# Patient Record
Sex: MALE | Employment: FULL TIME | URBAN - NONMETROPOLITAN AREA
[De-identification: names, ages, dates, MRNs, and addresses within clinical notes are randomized per-mention and may not be internally consistent; named-entity substitution may affect disease eponyms.]

---

## 2023-06-13 NOTE — PROGRESS NOTES
"HPI:    Fahad Sandoval is a 37 y.o. male is here today for PE/health maintenance      Patient is a NP to our office-    Pt states that he has a pain- states that he thinks it might be pleurisy, pain is in his right abd- this has been going on for a couple of weeks     Pt had CT abd - last Friday - was having the right sided pain at that time - sharp on and of for several weeks- neg tests/work up   No cough  No fever  No abd pain   No n/v    Had labs in the ED , but no CXR       When was last Tdap?  2015    Hx renal calculi- on and off this year- laser lithotripsy- May- Ed last Friday-sees urologist - Dr Pablo/Dr Mendoza - has appt today     Pt has boys ages 5 and 7 years     Wife is Zara    Immunization History   Administered Date(s) Administered    Influenza, injectable, quadrivalent, preservative free 09/24/2015    Influenza, seasonal, injectable, preservative free 11/27/2012, 11/24/2014    Tdap 09/24/2015         Current Outpatient Medications   Medication Instructions    oxyCODONE-acetaminophen (Percocet) 5-325 mg tablet TAKE 1 TABLET BY MOUTH EVERY 6 HOURS AS NEEDED FOR PAIN FOR UP TO 3 DAYS.    tamsulosin (FLOMAX) 0.4 mg                  Social History     Tobacco Use   Smoking Status Never   Smokeless Tobacco Never           ROS:      PE:    Vitals:    06/14/23 1021   BP: 115/76   BP Location: Right arm   Patient Position: Sitting   BP Cuff Size: Small adult   Pulse: 75   Resp: 14   Temp: 36.3 °C (97.4 °F)   TempSrc: Temporal   SpO2: 98%   Weight: 70.4 kg (155 lb 4.8 oz)   Height: 1.676 m (5' 6\")               Pt is A and O x3, NAD  Head- normocephalic and atraumatic,   EYES- conjunctiva- normal   lids- normal  EARS/NOSE- TM's normal, nasopharynx- normal and atraumatic  OROPHARYNX- normal  NECK- supple, FROM  THYROID- NT, normal size, no nodule noted  LYMPH- no cervical lymph nodes palpated   CV- RRR without murmur  PULM- CTA bilaterally, normal respiratory effort  RESPIRATORY EFFORT- normal , no " retractions or nasal flaring   ABD- normoactive BS's , soft , NT, no hepatosplenomegaly palpated  EXT- no edema,NT  SKIN- no abnormal skin lesions noted  NEURO- no focal deficits  PSYCH- pleasant, normal judgement and insight      The number and complexity of problems addressed is considered moderate.  The amount and/or complexity of data reviewed and analyzed is considered moderate. The risk of complications and/or morbidity/mortality of patient is considered moderate. Overall, this patient encounter is considered a moderate risk visit.        ASSESSMENT/PLAN:    Problem List Items Addressed This Visit    None  Visit Diagnoses       Well adult exam    -  Primary    Screening for diabetes mellitus        Screening for cholesterol level        Encounter for hepatitis C screening test for low risk patient        Pleuritic chest pain                No orders of the defined types were placed in this encounter.        Follow up in 12 months

## 2023-06-14 ENCOUNTER — OFFICE VISIT (OUTPATIENT)
Dept: PRIMARY CARE | Facility: CLINIC | Age: 38
End: 2023-06-14
Payer: COMMERCIAL

## 2023-06-14 ENCOUNTER — LAB (OUTPATIENT)
Dept: LAB | Facility: LAB | Age: 38
End: 2023-06-14
Payer: COMMERCIAL

## 2023-06-14 VITALS
RESPIRATION RATE: 14 BRPM | OXYGEN SATURATION: 98 % | HEART RATE: 75 BPM | DIASTOLIC BLOOD PRESSURE: 76 MMHG | SYSTOLIC BLOOD PRESSURE: 115 MMHG | BODY MASS INDEX: 24.96 KG/M2 | HEIGHT: 66 IN | TEMPERATURE: 97.4 F | WEIGHT: 155.3 LBS

## 2023-06-14 DIAGNOSIS — Z11.59 ENCOUNTER FOR HEPATITIS C SCREENING TEST FOR LOW RISK PATIENT: ICD-10-CM

## 2023-06-14 DIAGNOSIS — Z13.220 SCREENING FOR CHOLESTEROL LEVEL: ICD-10-CM

## 2023-06-14 DIAGNOSIS — Z00.00 WELL ADULT EXAM: Primary | ICD-10-CM

## 2023-06-14 DIAGNOSIS — Z13.1 SCREENING FOR DIABETES MELLITUS: ICD-10-CM

## 2023-06-14 DIAGNOSIS — R07.81 PLEURITIC CHEST PAIN: ICD-10-CM

## 2023-06-14 PROBLEM — N20.0 RENAL CALCULI: Status: ACTIVE | Noted: 2023-06-14

## 2023-06-14 PROCEDURE — 36415 COLL VENOUS BLD VENIPUNCTURE: CPT

## 2023-06-14 PROCEDURE — 99385 PREV VISIT NEW AGE 18-39: CPT | Performed by: FAMILY MEDICINE

## 2023-06-14 PROCEDURE — 80061 LIPID PANEL: CPT

## 2023-06-14 PROCEDURE — 1036F TOBACCO NON-USER: CPT | Performed by: FAMILY MEDICINE

## 2023-06-14 PROCEDURE — 86803 HEPATITIS C AB TEST: CPT

## 2023-06-14 PROCEDURE — 82947 ASSAY GLUCOSE BLOOD QUANT: CPT

## 2023-06-14 RX ORDER — OXYCODONE AND ACETAMINOPHEN 5; 325 MG/1; MG/1
TABLET ORAL
COMMUNITY
Start: 2023-06-09

## 2023-06-14 RX ORDER — TAMSULOSIN HYDROCHLORIDE 0.4 MG/1
0.4 CAPSULE ORAL
COMMUNITY
Start: 2023-06-09

## 2023-06-15 PROBLEM — Z11.59 ENCOUNTER FOR HEPATITIS C SCREENING TEST FOR LOW RISK PATIENT: Chronic | Status: ACTIVE | Noted: 2023-06-15

## 2023-06-15 PROBLEM — Z11.59 ENCOUNTER FOR HEPATITIS C SCREENING TEST FOR LOW RISK PATIENT: Status: ACTIVE | Noted: 2023-06-15

## 2023-06-15 LAB
CHOLESTEROL (MG/DL) IN SER/PLAS: 157 MG/DL (ref 0–199)
CHOLESTEROL IN HDL (MG/DL) IN SER/PLAS: 45.4 MG/DL
CHOLESTEROL/HDL RATIO: 3.5
GLUCOSE (MG/DL) IN SER/PLAS: 87 MG/DL (ref 74–99)
HEPATITIS C VIRUS AB PRESENCE IN SERUM: NONREACTIVE
LDL: 80 MG/DL (ref 0–99)
TRIGLYCERIDE (MG/DL) IN SER/PLAS: 158 MG/DL (ref 0–149)
VLDL: 32 MG/DL (ref 0–40)

## 2024-06-11 PROBLEM — N20.1 URETERAL CALCULUS, LEFT: Status: ACTIVE | Noted: 2023-01-19

## 2024-06-11 NOTE — PROGRESS NOTES
HPI:    Fahad Sandoval is a 38 y.o. male is here today for PE/health maintenance        When was last Tdap?  2015        [employer]PTFS recommend  laboratory  screening for HIV in patients ages 15-65  Discussed       C/o snores    STOP- BANG- low score so no sleep study ordered     STOP-BANG Questionnaire    Row Name 06/14/24 0900       STOP-BANG Questionnaire   Do you snore loudly? 1       Do you often feel tired or fatigued after your sleep? 0       Has anyone ever observed you stop breathing in your sleep? 0       Do you have or are you being treated for high blood pressure? 0       Recent BMI (Calculated) 25.1       Is BMI greater than 35 kg/m2? 0=No       Age older than 50 years old? 0=No       Is your neck circumference greater than 17 inches (Male) or 16 inches (Female)? 0       Gender - Male 1=Yes       STOP-BANG Total Score 2           Other medical specialists are involved in patient's care.    Any recent notes that were available were reviewed.    All conditions are monitored, evaluated and assessed regularly.    Patient is compliant with current treatment regimen/medications.    Specialists involved include:          Hx renal calculi-  Dr Pablo/Dr Mendoza     Pt has boys ages 6 and 8 - in fall 1st and 3rd grade      Works in office     Wife is Zara    Immunization History   Administered Date(s) Administered    Flu vaccine (IIV4), preservative free *Check age/dose* 09/24/2015    Influenza, seasonal, injectable, preservative free 11/27/2012, 11/24/2014    Tdap vaccine, age 7 year and older (BOOSTRIX, ADACEL) 09/24/2015         Current Outpatient Medications   Medication Instructions    cetirizine HCl (ZYRTEC ORAL) oral                  Social History     Tobacco Use   Smoking Status Never   Smokeless Tobacco Never     Social History     Substance and Sexual Activity   Alcohol Use Yes    Alcohol/week: 5.0 standard drinks of alcohol    Types: 5 Shots of liquor per week           ROS:      PE:    Vitals:     "06/14/24 0917   BP: 99/69   BP Location: Left arm   Patient Position: Sitting   BP Cuff Size: Adult   Pulse: 86   Resp: 16   Temp: 36.8 °C (98.2 °F)   SpO2: 96%   Weight: 67.3 kg (148 lb 6.4 oz)   Height: 1.676 m (5' 6\")                 Pt is A and O x3, NAD  Head- normocephalic and atraumatic,   EYES- conjunctiva- normal   lids- normal  EARS/NOSE- TM's normal, nasopharynx- normal and atraumatic  OROPHARYNX- normal  NECK- supple, FROM  THYROID- NT, normal size, no nodule noted  LYMPH- no cervical lymph nodes palpated   CV- RRR without murmur  PULM- CTA bilaterally, normal respiratory effort  RESPIRATORY EFFORT- normal , no retractions or nasal flaring   ABD- normoactive BS's , soft , NT, no hepatosplenomegaly palpated  EXT- no edema,NT  SKIN- no abnormal skin lesions noted  NEURO- no focal deficits  PSYCH- pleasant, normal judgement and insight      The number and complexity of problems addressed is considered moderate.  The amount and/or complexity of data reviewed and analyzed is considered moderate. The risk of complications and/or morbidity/mortality of patient is considered moderate. Overall, this patient encounter is considered a moderate risk visit.        ASSESSMENT/PLAN:    Problem List Items Addressed This Visit    None  Visit Diagnoses       Well adult exam    -  Primary    Screening for diabetes mellitus        Relevant Orders    Glucose    Screening for cholesterol level        Relevant Orders    Lipid Panel    Screening for HIV (human immunodeficiency virus)        Relevant Orders    HIV 1/2 Antigen/Antibody Screen with Reflex to Confirmation            Orders Placed This Encounter   Procedures    Glucose    Lipid Panel    HIV 1/2 Antigen/Antibody Screen with Reflex to Confirmation         Follow up in 12 months       Recommendations for men annual wellness exam:   Screening for Prostate Cancer- Men aged 55-69 years   colonoscopy at age 45, earlier if positive family history for breast or colon cancer "   Screening for lung cancer with low-dose CT in all adults age 50-80 who have a 20 pack-year smoking history and currently smoke or have quit within the past 15 years  Screen AAA- Ultrasound one time only- if history smoking 100 cigarettes or more - men ages 65-75 years   Follow a healthy diet (Examples, Dash diet, Mediterranean diet)  Exercise 150 minutes per week   Maintain healthy weight (BMI < 25)  Do not smoke   Alcohol in moderation (up to 1 drink/day)  Get enough sleep (7-8 hours/night)  Make sure immunizations are up to date   Visit dentist twice yearly    If you are less than 60 years old, have diabetes mellitus, or chronic kidney disease, your goal blood pressure is < 140/90.  If you are older than 60 years old and do not have diabetes or kidney disease, your goal blood pressure is < 150/90.

## 2024-06-14 ENCOUNTER — APPOINTMENT (OUTPATIENT)
Dept: PRIMARY CARE | Facility: CLINIC | Age: 39
End: 2024-06-14
Payer: COMMERCIAL

## 2024-06-14 ENCOUNTER — LAB (OUTPATIENT)
Dept: LAB | Facility: LAB | Age: 39
End: 2024-06-14
Payer: COMMERCIAL

## 2024-06-14 VITALS
WEIGHT: 148.4 LBS | RESPIRATION RATE: 16 BRPM | BODY MASS INDEX: 23.85 KG/M2 | DIASTOLIC BLOOD PRESSURE: 69 MMHG | OXYGEN SATURATION: 96 % | HEART RATE: 86 BPM | HEIGHT: 66 IN | SYSTOLIC BLOOD PRESSURE: 99 MMHG | TEMPERATURE: 98.2 F

## 2024-06-14 DIAGNOSIS — Z13.220 SCREENING FOR CHOLESTEROL LEVEL: ICD-10-CM

## 2024-06-14 DIAGNOSIS — Z11.4 SCREENING FOR HIV (HUMAN IMMUNODEFICIENCY VIRUS): ICD-10-CM

## 2024-06-14 DIAGNOSIS — Z13.1 SCREENING FOR DIABETES MELLITUS: ICD-10-CM

## 2024-06-14 DIAGNOSIS — Z00.00 WELL ADULT EXAM: Primary | ICD-10-CM

## 2024-06-14 LAB
CHOLEST SERPL-MCNC: 166 MG/DL (ref 0–199)
CHOLESTEROL/HDL RATIO: 3.3
GLUCOSE SERPL-MCNC: 97 MG/DL (ref 74–99)
HDLC SERPL-MCNC: 51 MG/DL
HIV 1+2 AB+HIV1 P24 AG SERPL QL IA: NONREACTIVE
LDLC SERPL CALC-MCNC: 80 MG/DL
NON HDL CHOLESTEROL: 115 MG/DL (ref 0–149)
TRIGL SERPL-MCNC: 177 MG/DL (ref 0–149)
VLDL: 35 MG/DL (ref 0–40)

## 2024-06-14 PROCEDURE — 1036F TOBACCO NON-USER: CPT | Performed by: FAMILY MEDICINE

## 2024-06-14 PROCEDURE — 82947 ASSAY GLUCOSE BLOOD QUANT: CPT

## 2024-06-14 PROCEDURE — 99395 PREV VISIT EST AGE 18-39: CPT | Performed by: FAMILY MEDICINE

## 2024-06-14 PROCEDURE — 80061 LIPID PANEL: CPT

## 2024-06-14 PROCEDURE — 87389 HIV-1 AG W/HIV-1&-2 AB AG IA: CPT

## 2024-06-14 PROCEDURE — 36415 COLL VENOUS BLD VENIPUNCTURE: CPT

## 2024-06-14 ASSESSMENT — PATIENT HEALTH QUESTIONNAIRE - PHQ9
SUM OF ALL RESPONSES TO PHQ9 QUESTIONS 1 AND 2: 0
2. FEELING DOWN, DEPRESSED OR HOPELESS: NOT AT ALL
1. LITTLE INTEREST OR PLEASURE IN DOING THINGS: NOT AT ALL

## 2025-06-13 PROBLEM — Z00.00 WELL ADULT EXAM: Chronic | Status: ACTIVE | Noted: 2025-06-13

## 2025-06-13 NOTE — PROGRESS NOTES
Subjective      HPI:          Fahad Sandoval is a 39 y.o. male 39 y.o. is here today for PE/health maintenance      Chief Complaint   Patient presents with    Annual Exam       GI issues-- blood in stool; thinks hemorrhoids. Had sigmoidoscopy around 2010   Started to see blood in stool- intermittent- bright red   Also intermittent abd discomfort for months   No nausea and vomiting  No fever  Normal appetite  Can not predict what is causing symptoms                     Patient c/o fatigue - wants testosterone checked       Due for labs       Health maintenance:   And General guidelines             Screen diabetes--  Screen Lipid Panel-- ratio:                       Health Maintenance Topics       Topic Date     Yearly Adult Physical today     Health Maintenance Topics with due status: Not Due       Topic Last Completion Date    DTaP/Tdap/Td Vaccines 09/24/2015- pt declines booster      Health Maintenance Topics with due status: Completed       Topic Last Completion Date    Hepatitis C Screening 06/14/2023    HIV Screening 06/14/2024                   Immunization History   Administered Date(s) Administered    Flu vaccine (IIV4), preservative free *Check age/dose* 09/24/2015, 09/24/2015    Flu vaccine, trivalent, preservative free, age 6 months and greater (Fluarix/Fluzone/Flulaval) 11/27/2012, 11/27/2012, 11/24/2014, 11/24/2014    Tdap vaccine, age 7 year and older (BOOSTRIX, ADACEL) 09/24/2015         Tobacco Use History[1]                  Social History     Substance and Sexual Activity   Alcohol Use Yes    Alcohol/week: 5.0 standard drinks of alcohol    Types: 5 Shots of liquor per week        Labs reported in this progress note have been reviewed at or prior to this office visit.      No visits with results within 12 Month(s) from this visit.   Latest known visit with results is:   Lab on 06/14/2024   Component Date Value Ref Range Status    Glucose 06/14/2024 97  74 - 99 mg/dL Final    Cholesterol 06/14/2024  166  0 - 199 mg/dL Final          Age      Desirable   Borderline High   High     0-19 Y     0 - 169       170 - 199     >/= 200    20-24 Y     0 - 189       190 - 224     >/= 225         >24 Y     0 - 199       200 - 239     >/= 240   **All ranges are based on fasting samples. Specific   therapeutic targets will vary based on patient-specific   cardiac risk.    Pediatric guidelines reference:Pediatrics 2011, 128(S5).Adult guidelines reference: NCEP ATPIII Guidelines,TY 2001, 258:2486-97    Venipuncture immediately after or during the administration of Metamizole may lead to falsely low results. Testing should be performed immediately prior to Metamizole dosing.    HDL-Cholesterol 06/14/2024 51.0  mg/dL Final      Age       Very Low   Low     Normal    High    0-19 Y    < 35      < 40     40-45     ----  20-24 Y    ----     < 40      >45      ----        >24 Y      ----     < 40     40-60      >60      Cholesterol/HDL Ratio 06/14/2024 3.3   Final      Ref Values  Desirable  < 3.4  High Risk  > 5.0    LDL Calculated 06/14/2024 80  <=99 mg/dL Final                                Near   Borderline      AGE      Desirable  Optimal    High     High     Very High     0-19 Y     0 - 109     ---    110-129   >/= 130     ----    20-24 Y     0 - 119     ---    120-159   >/= 160     ----      >24 Y     0 -  99   100-129  130-159   160-189     >/=190      VLDL 06/14/2024 35  0 - 40 mg/dL Final    Triglycerides 06/14/2024 177 (H)  0 - 149 mg/dL Final       Age         Desirable   Borderline High   High     Very High   0 D-90 D    19 - 174         ----         ----        ----  91 D- 9 Y     0 -  74        75 -  99     >/= 100      ----    10-19 Y     0 -  89        90 - 129     >/= 130      ----    20-24 Y     0 - 114       115 - 149     >/= 150      ----         >24 Y     0 - 149       150 - 199    200- 499    >/= 500    Venipuncture immediately after or during the administration of Metamizole may lead to falsely low results.  "Testing should be performed immediately prior to Metamizole dosing.    Non HDL Cholesterol 06/14/2024 115  0 - 149 mg/dL Final          Age       Desirable   Borderline High   High     Very High     0-19 Y     0 - 119       120 - 144     >/= 145    >/= 160    20-24 Y     0 - 149       150 - 189     >/= 190      ----         >24 Y    30 mg/dL above LDL Cholesterol goal      HIV 1/2 Antigen/Antibody Screen wi* 06/14/2024 Nonreactive  Nonreactive Final           Current Medications[2]    The following portions of the patient's chart were reviewed in this encounter and updated as appropriate:  Tobacco  Allergies  Meds  Problems  Med Hx  Surg Hx     Family History[3]                  Lifestyle and medical management to decrease cardiovascular risks.      Review of Systems      Review of Systems        Objective        PE:          Visit Vitals  /71 (BP Location: Left arm, Patient Position: Sitting, BP Cuff Size: Adult)   Pulse 74   Temp 36.2 °C (97.1 °F) (Temporal)   Resp 14   Ht 1.689 m (5' 6.5\")   Wt 71.4 kg (157 lb 4.8 oz)   SpO2 98%   BMI 25.01 kg/m²   Smoking Status Never   BSA 1.83 m²                    Pt is A and O x3, NAD  Head- normocephalic and atraumatic,   EYES- conjunctiva- normal   lids- normal  EARS/NOSE- TM's normal, nasopharynx- normal and atraumatic  OROPHARYNX- normal  NECK- supple, FROM  THYROID- NT, normal size, no nodule noted  LYMPH- no cervical lymph nodes palpated   CV- RRR without murmur  PULM- CTA bilaterally, normal respiratory effort  RESPIRATORY EFFORT- normal , no retractions or nasal flaring   ABD- normoactive BS's , soft , NT, no hepatosplenomegaly palpated  EXT- no edema,NT  SKIN- no abnormal skin lesions noted  NEURO- no focal deficits  PSYCH- pleasant, normal judgement and insight    BP Readings from Last 3 Encounters:   06/17/25 110/71   06/14/24 99/69   06/14/23 115/76         Wt Readings from Last 3 Encounters:   06/17/25 71.4 kg (157 lb 4.8 oz)   06/14/24 67.3 kg (148 " lb 6.4 oz)   06/14/23 70.4 kg (155 lb 4.8 oz)         BMI Readings from Last 3 Encounters:   06/17/25 25.01 kg/m²   06/14/24 23.95 kg/m²   06/14/23 25.07 kg/m²       The number and complexity of problems addressed is considered moderate.  The amount and/or complexity of data reviewed and analyzed is considered moderate. The risk of complications and/or morbidity/mortality of patient is considered moderate. Overall, this patient encounter is considered a moderate risk visit.        Assessment/Plan            Assessment & Plan  Well adult exam         Screening for diabetes mellitus         Screening for cholesterol level    Orders:    Lipid Panel; Future    Hematochezia    Orders:    Referral to Gastroenterology; Future    Other fatigue    Orders:    Basic Metabolic Panel; Future    CBC; Future    Hepatic Function Panel; Future    Testosterone; Future    Allergy, initial encounter    Orders:    cetirizine-pseudoephedrine (ZyrTEC-D) 5-120 mg 12 hr tablet; Take 1 tablet by mouth 2 times a day.               Ordered labs         Follow up 12 months- PE     Recommendations for men annual wellness exam:   Screening for Prostate Cancer- Men aged 55-69 years   colonoscopy at age 45, earlier if positive family history for breast or colon cancer   Screening for lung cancer with low-dose CT in all adults age 50-80 who have a 20 pack-year smoking history and currently smoke or have quit within the past 15 years  Screen AAA- Ultrasound one time only- if history smoking 100 cigarettes or more - men ages 65-75 years   Follow a healthy diet (Examples, Dash diet, Mediterranean diet)  Exercise 150 minutes per week   Maintain healthy weight (BMI < 25)  Do not smoke   Alcohol in moderation (up to 1 drink/day)  Get enough sleep (7-8 hours/night)  Make sure immunizations are up to date   Visit dentist twice yearly    If you are less than 60 years old, have diabetes mellitus, or chronic kidney disease, your goal blood pressure is <  140/90.  If you are older than 60 years old and do not have diabetes or kidney disease, your goal blood pressure is < 150/90.             [1]   Social History  Tobacco Use   Smoking Status Never   Smokeless Tobacco Never   [2]   Current Outpatient Medications:     cetirizine HCl (ZYRTEC ORAL), Take by mouth., Disp: , Rfl:   [3] No family history on file.

## 2025-06-17 ENCOUNTER — APPOINTMENT (OUTPATIENT)
Dept: PRIMARY CARE | Facility: CLINIC | Age: 40
End: 2025-06-17
Payer: COMMERCIAL

## 2025-06-17 VITALS
HEIGHT: 67 IN | BODY MASS INDEX: 24.69 KG/M2 | TEMPERATURE: 97.1 F | OXYGEN SATURATION: 98 % | HEART RATE: 74 BPM | DIASTOLIC BLOOD PRESSURE: 71 MMHG | RESPIRATION RATE: 14 BRPM | SYSTOLIC BLOOD PRESSURE: 110 MMHG | WEIGHT: 157.3 LBS

## 2025-06-17 DIAGNOSIS — K92.1 HEMATOCHEZIA: Chronic | ICD-10-CM

## 2025-06-17 DIAGNOSIS — Z13.220 SCREENING FOR CHOLESTEROL LEVEL: Chronic | ICD-10-CM

## 2025-06-17 DIAGNOSIS — R53.83 OTHER FATIGUE: Chronic | ICD-10-CM

## 2025-06-17 DIAGNOSIS — T78.40XA ALLERGY, INITIAL ENCOUNTER: Chronic | ICD-10-CM

## 2025-06-17 DIAGNOSIS — Z13.1 SCREENING FOR DIABETES MELLITUS: Chronic | ICD-10-CM

## 2025-06-17 DIAGNOSIS — Z00.00 WELL ADULT EXAM: Primary | Chronic | ICD-10-CM

## 2025-06-17 PROCEDURE — 3008F BODY MASS INDEX DOCD: CPT | Performed by: FAMILY MEDICINE

## 2025-06-17 PROCEDURE — 99395 PREV VISIT EST AGE 18-39: CPT | Performed by: FAMILY MEDICINE

## 2025-06-17 PROCEDURE — 1036F TOBACCO NON-USER: CPT | Performed by: FAMILY MEDICINE

## 2025-06-17 RX ORDER — CETIRIZINE HYDROCHLORIDE, PSEUDOEPHEDRINE HYDROCHLORIDE 5; 120 MG/1; MG/1
1 TABLET, FILM COATED, EXTENDED RELEASE ORAL 2 TIMES DAILY
Qty: 60 TABLET | Refills: 11 | Status: SHIPPED | OUTPATIENT
Start: 2025-06-17 | End: 2026-06-17

## 2025-06-17 NOTE — ASSESSMENT & PLAN NOTE
Orders:    Basic Metabolic Panel; Future    CBC; Future    Hepatic Function Panel; Future    Testosterone; Future

## 2025-06-17 NOTE — ASSESSMENT & PLAN NOTE
Orders:    cetirizine-pseudoephedrine (ZyrTEC-D) 5-120 mg 12 hr tablet; Take 1 tablet by mouth 2 times a day.

## 2025-06-24 DIAGNOSIS — R53.83 OTHER FATIGUE: Chronic | ICD-10-CM

## 2025-07-12 LAB
ALBUMIN SERPL-MCNC: 4.8 G/DL (ref 3.6–5.1)
ALBUMIN/GLOB SERPL: 1.7 (CALC) (ref 1–2.5)
ALP SERPL-CCNC: 79 U/L (ref 36–130)
ALT SERPL-CCNC: 15 U/L (ref 9–46)
ANION GAP SERPL CALCULATED.4IONS-SCNC: 13 MMOL/L (CALC) (ref 7–17)
AST SERPL-CCNC: 20 U/L (ref 10–40)
BILIRUB DIRECT SERPL-MCNC: 0.1 MG/DL
BILIRUB INDIRECT SERPL-MCNC: 0.4 MG/DL (CALC) (ref 0.2–1.2)
BILIRUB SERPL-MCNC: 0.5 MG/DL (ref 0.2–1.2)
BUN SERPL-MCNC: 16 MG/DL (ref 7–25)
BUN/CREAT SERPL: ABNORMAL (CALC) (ref 6–22)
CALCIUM SERPL-MCNC: 9.7 MG/DL (ref 8.6–10.3)
CHLORIDE SERPL-SCNC: 104 MMOL/L (ref 98–110)
CHOLEST SERPL-MCNC: 176 MG/DL
CHOLEST/HDLC SERPL: 3.1 (CALC)
CO2 SERPL-SCNC: 23 MMOL/L (ref 20–32)
CREAT SERPL-MCNC: 0.98 MG/DL (ref 0.6–1.26)
EGFRCR SERPLBLD CKD-EPI 2021: 101 ML/MIN/1.73M2
ERYTHROCYTE [DISTWIDTH] IN BLOOD BY AUTOMATED COUNT: 12.9 % (ref 11–15)
GLOBULIN SER CALC-MCNC: 2.9 G/DL (CALC) (ref 1.9–3.7)
GLUCOSE SERPL-MCNC: 110 MG/DL (ref 65–99)
HCT VFR BLD AUTO: 47.9 % (ref 38.5–50)
HDLC SERPL-MCNC: 57 MG/DL
HGB BLD-MCNC: 16.1 G/DL (ref 13.2–17.1)
LDLC SERPL CALC-MCNC: 96 MG/DL (CALC)
MCH RBC QN AUTO: 29.1 PG (ref 27–33)
MCHC RBC AUTO-ENTMCNC: 33.6 G/DL (ref 32–36)
MCV RBC AUTO: 86.6 FL (ref 80–100)
NONHDLC SERPL-MCNC: 119 MG/DL (CALC)
PLATELET # BLD AUTO: 272 THOUSAND/UL (ref 140–400)
PMV BLD REES-ECKER: 9.7 FL (ref 7.5–12.5)
POTASSIUM SERPL-SCNC: 5.1 MMOL/L (ref 3.5–5.3)
PROT SERPL-MCNC: 7.7 G/DL (ref 6.1–8.1)
RBC # BLD AUTO: 5.53 MILLION/UL (ref 4.2–5.8)
SODIUM SERPL-SCNC: 140 MMOL/L (ref 135–146)
TESTOST SERPL-MCNC: 495 NG/DL (ref 250–827)
TRIGL SERPL-MCNC: 124 MG/DL
WBC # BLD AUTO: 4.3 THOUSAND/UL (ref 3.8–10.8)

## 2025-07-14 PROBLEM — R73.03 PREDIABETES: Chronic | Status: ACTIVE | Noted: 2025-07-14

## 2026-06-17 ENCOUNTER — APPOINTMENT (OUTPATIENT)
Dept: PRIMARY CARE | Facility: CLINIC | Age: 41
End: 2026-06-17
Payer: COMMERCIAL